# Patient Record
Sex: MALE | Employment: FULL TIME | URBAN - METROPOLITAN AREA
[De-identification: names, ages, dates, MRNs, and addresses within clinical notes are randomized per-mention and may not be internally consistent; named-entity substitution may affect disease eponyms.]

---

## 2017-09-04 ENCOUNTER — HOSPITAL ENCOUNTER (EMERGENCY)
Age: 27
Discharge: HOME | End: 2017-09-04
Attending: EMERGENCY MEDICINE
Payer: COMMERCIAL

## 2017-09-04 VITALS
OXYGEN SATURATION: 98 % | RESPIRATION RATE: 16 BRPM | WEIGHT: 130 LBS | HEIGHT: 69 IN | SYSTOLIC BLOOD PRESSURE: 125 MMHG | HEART RATE: 88 BPM | DIASTOLIC BLOOD PRESSURE: 86 MMHG | TEMPERATURE: 98.3 F | BODY MASS INDEX: 19.26 KG/M2

## 2017-09-04 DIAGNOSIS — B20 HIV (HUMAN IMMUNODEFICIENCY VIRUS INFECTION) (HCC): ICD-10-CM

## 2017-09-04 DIAGNOSIS — E87.6 HYPOKALEMIA: ICD-10-CM

## 2017-09-04 DIAGNOSIS — E87.1 HYPONATREMIA: ICD-10-CM

## 2017-09-04 DIAGNOSIS — E86.0 DEHYDRATION: Primary | ICD-10-CM

## 2017-09-04 LAB
ALBUMIN SERPL-MCNC: 4.1 G/DL (ref 3.4–5)
ALBUMIN/GLOB SERPL: 1 {RATIO} (ref 0.8–1.7)
ALP SERPL-CCNC: 71 U/L (ref 45–117)
ALT SERPL-CCNC: 38 U/L (ref 16–61)
ANION GAP SERPL CALC-SCNC: 8 MMOL/L (ref 3–18)
APPEARANCE UR: CLEAR
AST SERPL-CCNC: 72 U/L (ref 15–37)
BACTERIA URNS QL MICRO: NEGATIVE /HPF
BASOPHILS # BLD: 0 K/UL (ref 0–0.1)
BASOPHILS NFR BLD: 0 % (ref 0–2)
BILIRUB SERPL-MCNC: 0.2 MG/DL (ref 0.2–1)
BILIRUB UR QL: NEGATIVE
BUN SERPL-MCNC: 10 MG/DL (ref 7–18)
BUN/CREAT SERPL: 11 (ref 12–20)
CALCIUM SERPL-MCNC: 8.4 MG/DL (ref 8.5–10.1)
CHLORIDE SERPL-SCNC: 100 MMOL/L (ref 100–108)
CO2 SERPL-SCNC: 25 MMOL/L (ref 21–32)
COLOR UR: ABNORMAL
CREAT SERPL-MCNC: 0.93 MG/DL (ref 0.6–1.3)
DIFFERENTIAL METHOD BLD: ABNORMAL
EOSINOPHIL # BLD: 0.1 K/UL (ref 0–0.4)
EOSINOPHIL NFR BLD: 2 % (ref 0–5)
EPITH CASTS URNS QL MICRO: ABNORMAL /LPF (ref 0–5)
ERYTHROCYTE [DISTWIDTH] IN BLOOD BY AUTOMATED COUNT: 12.1 % (ref 11.6–14.5)
GLOBULIN SER CALC-MCNC: 4 G/DL (ref 2–4)
GLUCOSE SERPL-MCNC: 97 MG/DL (ref 74–99)
GLUCOSE UR STRIP.AUTO-MCNC: NEGATIVE MG/DL
HCT VFR BLD AUTO: 36.5 % (ref 36–48)
HGB BLD-MCNC: 12.7 G/DL (ref 13–16)
HGB UR QL STRIP: ABNORMAL
KETONES UR QL STRIP.AUTO: ABNORMAL MG/DL
LEUKOCYTE ESTERASE UR QL STRIP.AUTO: NEGATIVE
LIPASE SERPL-CCNC: 114 U/L (ref 73–393)
LYMPHOCYTES # BLD: 1 K/UL (ref 0.9–3.6)
LYMPHOCYTES NFR BLD: 20 % (ref 21–52)
MCH RBC QN AUTO: 29.8 PG (ref 24–34)
MCHC RBC AUTO-ENTMCNC: 34.8 G/DL (ref 31–37)
MCV RBC AUTO: 85.7 FL (ref 74–97)
MONOCYTES # BLD: 0.6 K/UL (ref 0.05–1.2)
MONOCYTES NFR BLD: 13 % (ref 3–10)
MUCOUS THREADS URNS QL MICRO: ABNORMAL /LPF
NEUTS SEG # BLD: 3.3 K/UL (ref 1.8–8)
NEUTS SEG NFR BLD: 65 % (ref 40–73)
NITRITE UR QL STRIP.AUTO: NEGATIVE
PH UR STRIP: 6.5 [PH] (ref 5–8)
PLATELET # BLD AUTO: 268 K/UL (ref 135–420)
PMV BLD AUTO: 9.2 FL (ref 9.2–11.8)
POTASSIUM SERPL-SCNC: 3.1 MMOL/L (ref 3.5–5.5)
PROT SERPL-MCNC: 8.1 G/DL (ref 6.4–8.2)
PROT UR STRIP-MCNC: ABNORMAL MG/DL
RBC # BLD AUTO: 4.26 M/UL (ref 4.7–5.5)
RBC #/AREA URNS HPF: ABNORMAL /HPF (ref 0–5)
SODIUM SERPL-SCNC: 133 MMOL/L (ref 136–145)
SP GR UR REFRACTOMETRY: 1.03 (ref 1–1.03)
UROBILINOGEN UR QL STRIP.AUTO: 1 EU/DL (ref 0.2–1)
WBC # BLD AUTO: 5.1 K/UL (ref 4.6–13.2)
WBC URNS QL MICRO: ABNORMAL /HPF (ref 0–4)

## 2017-09-04 PROCEDURE — 99283 EMERGENCY DEPT VISIT LOW MDM: CPT

## 2017-09-04 PROCEDURE — 85025 COMPLETE CBC W/AUTO DIFF WBC: CPT | Performed by: EMERGENCY MEDICINE

## 2017-09-04 PROCEDURE — 74011250637 HC RX REV CODE- 250/637: Performed by: EMERGENCY MEDICINE

## 2017-09-04 PROCEDURE — 80053 COMPREHEN METABOLIC PANEL: CPT | Performed by: EMERGENCY MEDICINE

## 2017-09-04 PROCEDURE — 83690 ASSAY OF LIPASE: CPT | Performed by: EMERGENCY MEDICINE

## 2017-09-04 PROCEDURE — 96361 HYDRATE IV INFUSION ADD-ON: CPT

## 2017-09-04 PROCEDURE — 96374 THER/PROPH/DIAG INJ IV PUSH: CPT

## 2017-09-04 PROCEDURE — 74011000250 HC RX REV CODE- 250: Performed by: EMERGENCY MEDICINE

## 2017-09-04 PROCEDURE — 81001 URINALYSIS AUTO W/SCOPE: CPT | Performed by: EMERGENCY MEDICINE

## 2017-09-04 PROCEDURE — 74011250636 HC RX REV CODE- 250/636: Performed by: EMERGENCY MEDICINE

## 2017-09-04 RX ORDER — ONDANSETRON 8 MG/1
8 TABLET, ORALLY DISINTEGRATING ORAL
Qty: 12 TAB | Refills: 0 | Status: SHIPPED | OUTPATIENT
Start: 2017-09-04

## 2017-09-04 RX ORDER — FAMOTIDINE 10 MG/ML
20 INJECTION INTRAVENOUS
Status: COMPLETED | OUTPATIENT
Start: 2017-09-04 | End: 2017-09-04

## 2017-09-04 RX ORDER — ONDANSETRON 4 MG/1
4 TABLET, ORALLY DISINTEGRATING ORAL
Status: COMPLETED | OUTPATIENT
Start: 2017-09-04 | End: 2017-09-04

## 2017-09-04 RX ORDER — ONDANSETRON 8 MG/1
8 TABLET, ORALLY DISINTEGRATING ORAL
Status: DISCONTINUED | OUTPATIENT
Start: 2017-09-04 | End: 2017-09-04

## 2017-09-04 RX ORDER — POTASSIUM CHLORIDE 1.5 G/1.77G
40 POWDER, FOR SOLUTION ORAL
Status: COMPLETED | OUTPATIENT
Start: 2017-09-04 | End: 2017-09-04

## 2017-09-04 RX ADMIN — POTASSIUM CHLORIDE 40 MEQ: 1.5 POWDER, FOR SOLUTION ORAL at 18:26

## 2017-09-04 RX ADMIN — ONDANSETRON 4 MG: 4 TABLET, ORALLY DISINTEGRATING ORAL at 17:32

## 2017-09-04 RX ADMIN — SODIUM CHLORIDE 1000 ML: 900 INJECTION, SOLUTION INTRAVENOUS at 18:14

## 2017-09-04 RX ADMIN — FAMOTIDINE 20 MG: 10 INJECTION, SOLUTION INTRAVENOUS at 19:40

## 2017-09-04 NOTE — DISCHARGE INSTRUCTIONS
Dehydration: Care Instructions  Your Care Instructions  Dehydration happens when your body loses too much fluid. This might happen when you do not drink enough water or you lose large amounts of fluids from your body because of diarrhea, vomiting, or sweating. Severe dehydration can be life-threatening. Water and minerals called electrolytes help put your body fluids back in balance. Learn the early signs of fluid loss, and drink more fluids to prevent dehydration. Follow-up care is a key part of your treatment and safety. Be sure to make and go to all appointments, and call your doctor if you are having problems. It's also a good idea to know your test results and keep a list of the medicines you take. How can you care for yourself at home? · To prevent dehydration, drink plenty of fluids, enough so that your urine is light yellow or clear like water. Choose water and other caffeine-free clear liquids until you feel better. If you have kidney, heart, or liver disease and have to limit fluids, talk with your doctor before you increase the amount of fluids you drink. · If you do not feel like eating or drinking, try taking small sips of water, sports drinks, or other rehydration drinks. · Get plenty of rest.  To prevent dehydration  · Add more fluids to your diet and daily routine, unless your doctor has told you not to. · During hot weather, drink more fluids. Drink even more fluids if you exercise a lot. Stay away from drinks with alcohol or caffeine. · Watch for the symptoms of dehydration. These include:  ¨ A dry, sticky mouth. ¨ Dark yellow urine, and not much of it. ¨ Dry and sunken eyes. ¨ Feeling very tired. · Learn what problems can lead to dehydration. These include:  ¨ Diarrhea, fever, and vomiting. ¨ Any illness with a fever, such as pneumonia or the flu. ¨ Activities that cause heavy sweating, such as endurance races and heavy outdoor work in hot or humid weather.   ¨ Alcohol or drug abuse or withdrawal.  ¨ Certain medicines, such as cold and allergy pills (antihistamines), diet pills (diuretics), and laxatives. ¨ Certain diseases, such as diabetes, cancer, and heart or kidney disease. When should you call for help? Call 911 anytime you think you may need emergency care. For example, call if:  · You passed out (lost consciousness). Call your doctor now or seek immediate medical care if:  · You are confused and cannot think clearly. · You are dizzy or lightheaded, or you feel like you may faint. · You have signs of needing more fluids. You have sunken eyes and a dry mouth, and you pass only a little dark urine. · You cannot keep fluids down. Watch closely for changes in your health, and be sure to contact your doctor if:  · You are not making tears. · Your skin is very dry and sags slowly back into place after you pinch it. · Your mouth and eyes are very dry. Where can you learn more? Go to http://griselda-howie.info/. Enter F716 in the search box to learn more about \"Dehydration: Care Instructions. \"  Current as of: March 20, 2017  Content Version: 11.3  © 0209-3035 [employer]. Care instructions adapted under license by [employer] (which disclaims liability or warranty for this information). If you have questions about a medical condition or this instruction, always ask your healthcare professional. [place] [address] any warranty or liability for your use of this information. Hypokalemia: Care Instructions  Your Care Instructions  Hypokalemia (say \"wk-kx-rfr-ENEDINA-karie-uh\") is a low level of potassium. The heart, muscles, kidneys, and nervous system all need potassium to work well. This problem has many different causes. Kidney problems, diet, and medicines like diuretics and laxatives can cause it. So can vomiting or diarrhea. In some cases, cancer is the cause.  Your doctor may do tests to find the cause of your low potassium levels. You may need medicines to bring your potassium levels back to normal. You may also need regular blood tests to check your potassium. If you have very low potassium, you may need intravenous (IV) medicines. You also may need tests to check the electrical activity of your heart. Heart problems caused by low potassium levels can be very serious. Follow-up care is a key part of your treatment and safety. Be sure to make and go to all appointments, and call your doctor if you are having problems. It's also a good idea to know your test results and keep a list of the medicines you take. How can you care for yourself at home? · If your doctor recommends it, eat foods that have a lot of potassium. These include fresh fruits, juices, and vegetables. They also include nuts, beans, and milk. · Be safe with medicines. If your doctor prescribes medicines or potassium supplements, take them exactly as directed. Call your doctor if you have any problems with your medicines. · Get your potassium levels tested as often as your doctor tells you. When should you call for help? Call 911 anytime you think you may need emergency care. For example, call if:  · You feel like your heart is missing beats. Heart problems caused by low potassium can cause death. · You passed out (lost consciousness). · You have a seizure. Call your doctor now or seek immediate medical care if:  · You feel weak or unusually tired. · You have severe arm or leg cramps. · You have tingling or numbness. · You feel sick to your stomach, or you vomit. · You have belly cramps. · You feel bloated or constipated. · You have to urinate a lot. · You feel very thirsty most of the time. · You are dizzy or lightheaded, or you feel like you may faint. · You feel depressed, or you lose touch with reality. Watch closely for changes in your health, and be sure to contact your doctor if:  · You do not get better as expected.   Where can you learn more? Go to http://griselda-howie.info/. Enter G358 in the search box to learn more about \"Hypokalemia: Care Instructions. \"  Current as of: July 28, 2016  Content Version: 11.3  © 6778-7663 [employer]. Care instructions adapted under license by [employer] (which disclaims liability or warranty for this information). If you have questions about a medical condition or this instruction, always ask your healthcare professional. [address] any warranty or liability for your use of this information. Hyponatremia: Care Instructions  Your Care Instructions  Hyponatremia (say \"gj-jf-nrx-TREE-karie-uh\") means that you don't have enough sodium in your blood. It can cause nausea, vomiting, and headaches. Or you may not feel hungry. In serious cases, it can cause seizures, a coma, or even death. Hyponatremia is not a disease. It is a problem caused by something else, such as medicines or exercising for a long time in hot weather. You can get hyponatremia if you lose a lot of fluids and then you drink a lot of water or other liquids that don't have much sodium. You can also get it if you have kidney, liver, heart, or other health problems. Treatment is focused on getting your sodium levels back to normal.  Follow-up care is a key part of your treatment and safety. Be sure to make and go to all appointments, and call your doctor if you are having problems. It's also a good idea to know your test results and keep a list of the medicines you take. How can you care for yourself at home? · If your doctor recommends it, drink fluids that have sodium. Sports drinks are a good choice. Or you can eat salty foods. · If your doctor recommends it, limit the amount of water you drink. And limit fluids that are mostly water. These include tea, coffee, and juice. · Take your medicines exactly as prescribed.  Call your doctor if you have any problems with your medicine. · Get your sodium levels tested when your doctor tells you to. When should you call for help? Call 911 anytime you think you may need emergency care. For example, call if:  · You have a seizure. · You passed out (lost consciousness). Call your doctor now or seek immediate medical care if:  · You are confused or it is hard to focus. · You have little or no appetite. · You feel sick to your stomach or you vomit. · You have a headache. · You have mood changes. · You feel more tired than usual.  Watch closely for changes in your health, and be sure to contact your doctor if:  · You do not get better as expected. Where can you learn more? Go to http://griselda-howie.info/. Enter K546 in the search box to learn more about \"Hyponatremia: Care Instructions. \"  Current as of: October 14, 2016  Content Version: 11.3  © 4818-2339 [employer]. Care instructions adapted under license by [employer] (which disclaims liability or warranty for this information). If you have questions about a medical condition or this instruction, always ask your healthcare professional. [place] [address] any warranty or liability for your use of this information. HIV Medicine Management: Care Instructions  Your Care Instructions  Taking antiretroviral medicines for HIV may allow you to stay healthy for a long time. Successful treatment of your HIV infection depends on following a strict schedule for taking medicine. Not taking medicine when you are supposed to can lead to problems such as drug resistance and higher viral loads, and the disease may get worse. In the past, schedules for taking medicine for HIV were very complicated. Taking many pills several times each day was difficult. But the routine has become much more simple, and you may only have to take medicine one or two times each day. Follow-up care is a key part of your treatment and safety.  Be sure to make and go to all appointments, and call your doctor if you are having problems. It's also a good idea to know your test results and keep a list of the medicines you take. How can you care for yourself at home? Staying on your schedule  · Know the names of all of your medicines. Ask your doctor or pharmacist to clearly explain the actions and purpose of each of your medicines. If you understand what you are taking and how it helps, it may be easier to follow your schedule. Write down the generic name (and brand name if there is one) for all of your medicines. Have your doctor check the list.  · Know when to take your medicine. Write down a schedule, and have your doctor check it. Try to take them around the same times every day. · Use a pillbox with compartments for each time you need to take your medicines. Using a pillbox lets you know when you have taken each dose so that you do not miss a dose. And it also will help you not take too many pills. · Know how to handle missed doses. Talk with your doctor about what you should do if you miss a dose. Discuss what to do for each medicine--it may be different for each one. Other tips  · Always check with your doctor before taking any other medicines. This includes over-the-counter medicines and any herbal or \"natural\" supplements. · Learn about other medicines you should not take at the same time as your antiretroviral medicines. · Store medicines properly. Find out from your doctor or pharmacist how to properly store your medicines. Keeping medicines in a location that is too hot or too cold may decrease how well they work. Always store medicines out of the reach of children. · Watch for side effects. Ask your doctor or pharmacist what to expect. Tell your doctor right away if you have any serious side effects. Do not stop taking your medicine or change the dose on your own. This could cause the medicine to stop working.  Work with your doctor to find a solution. · Make a list of all the medicines you take, and bring it with you to each visit with your doctor. Have your doctor review your list at each visit. When should you call for help? Call 911 anytime you think you may need emergency care. For example, call if:  · You are wheezing or having trouble breathing after starting a medicine. · Your lips, throat, tongue, or face are swelling quickly. Call your doctor now or seek immediate medical care if:  · You have problems taking your medicine. · You have trouble taking your medicine when you are supposed to. · You notice side effects from your medicine. These may include:  ¨ A skin rash. ¨ A fever. ¨ Nausea and vomiting. ¨ Fatigue. ¨ Trouble breathing. Watch closely for changes in your health, and be sure to contact your doctor if you have any problems. Where can you learn more? Go to http://griselda-howie.info/. Enter 0664 [id number] in the search box to learn more about \"HIV Medicine Management: Care Instructions. \"  Current as of: March 3, 2017  Content Version: 11.3  © 1677-1621 [employer]. Care instructions adapted under license by [employer] (which disclaims liability or warranty for this information). If you have questions about a medical condition or this instruction, always ask your healthcare professional. [address] any warranty or liability for your use of this information.

## 2017-09-04 NOTE — ED TRIAGE NOTES
Vomiting, diarrhea Friday night, then  Over weekend had hiccups , and abd pain. No vomiting today, no diarrhea today.

## 2017-09-04 NOTE — ED PROVIDER NOTES
HPI Comments: Kirk Gonzalez is a 28 y.o. male with of HIV presenting to the ED with vomiting and diarrhea that began 3 days ago. Severity is 7/10. Pt states he has not had any vomiting or diarrhea today. Notes abdominal pain when he bends down. Associated sx include nausea and intermittent hiccups that began 2 days ago. Reports appetite change when sx began. Admits to smoking. Denies any other complaints. The history is provided by the patient. No past medical history on file. No past surgical history on file. No family history on file. Social History     Social History    Marital status: SINGLE     Spouse name: N/A    Number of children: N/A    Years of education: N/A     Occupational History    Not on file. Social History Main Topics    Smoking status: Current Every Day Smoker     Packs/day: 0.25     Years: 8.00    Smokeless tobacco: Never Used    Alcohol use 1.2 oz/week     2 Shots of liquor per week      Comment: socially    Drug use: No    Sexual activity: Yes     Partners: Male     Other Topics Concern    Not on file     Social History Narrative         ALLERGIES: Review of patient's allergies indicates no known allergies. Review of Systems   Constitutional: Positive for appetite change. Negative for activity change, fatigue and fever. Intermittent hiccups    HENT: Negative for congestion and rhinorrhea. Eyes: Negative for visual disturbance. Respiratory: Negative for shortness of breath. Cardiovascular: Negative for chest pain and palpitations. Gastrointestinal: Positive for abdominal pain, diarrhea, nausea and vomiting. Genitourinary: Negative for dysuria and hematuria. Musculoskeletal: Negative for back pain. Skin: Negative for rash. Neurological: Negative for dizziness, weakness and light-headedness. All other systems reviewed and are negative.       Vitals:    09/04/17 1629   BP: 125/86   Pulse: 88   Resp: 16   Temp: 98.3 °F (36.8 °C) SpO2: 98%   Weight: 59 kg (130 lb)   Height: 5' 9\" (1.753 m)            Physical Exam   Constitutional: He is oriented to person, place, and time. He appears well-developed and well-nourished. No distress. Thin, no distress    HENT:   Head: Normocephalic and atraumatic. Right Ear: External ear normal.   Left Ear: External ear normal.   Nose: Nose normal.   Mouth/Throat: Oropharynx is clear and moist.   Eyes: Conjunctivae and EOM are normal. Pupils are equal, round, and reactive to light. No scleral icterus. Neck: Normal range of motion. Neck supple. No JVD present. No tracheal deviation present. No thyromegaly present. Cardiovascular: Normal rate, regular rhythm, normal heart sounds and intact distal pulses. Exam reveals no gallop and no friction rub. No murmur heard. Pulmonary/Chest: Effort normal and breath sounds normal. He exhibits no tenderness. Abdominal: Soft. Bowel sounds are normal. He exhibits no distension. There is tenderness. There is no rebound and no guarding. Epigastric pain without guarding    Musculoskeletal: Normal range of motion. He exhibits no edema or tenderness. Lymphadenopathy:     He has no cervical adenopathy. Neurological: He is alert and oriented to person, place, and time. No cranial nerve deficit. Coordination normal.   No sensory loss, Gait normal, Motor 5/5   Skin: Skin is warm and dry. Psychiatric: He has a normal mood and affect. His behavior is normal. Judgment and thought content normal.   Nursing note and vitals reviewed. MDM  Number of Diagnoses or Management Options  Diagnosis management comments: Pt is a 30yo male with a hx of HIV on HAART combination medication for one month presents with complaint of diarrhea, vomiting, and epigastric pain for the past 4 days. Pt nausea, vomiting, and diarrhea have improved and now he has persistent hiccups. Pt abdomen is soft with mild epigastric pain without distension or masses.   Will follow axel, h/h, UA, lfts, lipase then reevaluate. Brett Medrano DO 5:00 PM      ED Course       Procedures      Vitals:  Patient Vitals for the past 12 hrs:   Temp Pulse Resp BP SpO2   09/04/17 1629 98.3 °F (36.8 °C) 88 16 125/86 98 %       Medications Ordered:  Medications   famotidine (PF) (PEPCID) injection 20 mg (not administered)   ondansetron (ZOFRAN ODT) tablet 4 mg (4 mg Oral Given 9/4/17 1732)   potassium chloride (KLOR-CON) packet 40 mEq (40 mEq Oral Given 9/4/17 1826)   sodium chloride 0.9 % bolus infusion 1,000 mL (1,000 mL IntraVENous New Bag 9/4/17 1814)       Lab Findings:  Recent Results (from the past 12 hour(s))   URINALYSIS W/ RFLX MICROSCOPIC    Collection Time: 09/04/17  4:45 PM   Result Value Ref Range    Color DARK YELLOW      Appearance CLEAR      Specific gravity 1.027 1.005 - 1.030      pH (UA) 6.5 5.0 - 8.0      Protein TRACE (A) NEG mg/dL    Glucose NEGATIVE  NEG mg/dL    Ketone TRACE (A) NEG mg/dL    Bilirubin NEGATIVE  NEG      Blood SMALL (A) NEG      Urobilinogen 1.0 0.2 - 1.0 EU/dL    Nitrites NEGATIVE  NEG      Leukocyte Esterase NEGATIVE  NEG     URINE MICROSCOPIC ONLY    Collection Time: 09/04/17  4:45 PM   Result Value Ref Range    WBC 1 to 3 0 - 4 /hpf    RBC 3 to 5 0 - 5 /hpf    Epithelial cells FEW 0 - 5 /lpf    Bacteria NEGATIVE  NEG /hpf    Mucus FEW (A) NEG /lpf   CBC WITH AUTOMATED DIFF    Collection Time: 09/04/17  4:53 PM   Result Value Ref Range    WBC 5.1 4.6 - 13.2 K/uL    RBC 4.26 (L) 4.70 - 5.50 M/uL    HGB 12.7 (L) 13.0 - 16.0 g/dL    HCT 36.5 36.0 - 48.0 %    MCV 85.7 74.0 - 97.0 FL    MCH 29.8 24.0 - 34.0 PG    MCHC 34.8 31.0 - 37.0 g/dL    RDW 12.1 11.6 - 14.5 %    PLATELET 614 548 - 146 K/uL    MPV 9.2 9.2 - 11.8 FL    NEUTROPHILS 65 40 - 73 %    LYMPHOCYTES 20 (L) 21 - 52 %    MONOCYTES 13 (H) 3 - 10 %    EOSINOPHILS 2 0 - 5 %    BASOPHILS 0 0 - 2 %    ABS. NEUTROPHILS 3.3 1.8 - 8.0 K/UL    ABS. LYMPHOCYTES 1.0 0.9 - 3.6 K/UL    ABS. MONOCYTES 0.6 0.05 - 1.2 K/UL    ABS. EOSINOPHILS 0.1 0.0 - 0.4 K/UL    ABS. BASOPHILS 0.0 0.0 - 0.1 K/UL    DF AUTOMATED     METABOLIC PANEL, COMPREHENSIVE    Collection Time: 09/04/17  4:53 PM   Result Value Ref Range    Sodium 133 (L) 136 - 145 mmol/L    Potassium 3.1 (L) 3.5 - 5.5 mmol/L    Chloride 100 100 - 108 mmol/L    CO2 25 21 - 32 mmol/L    Anion gap 8 3.0 - 18 mmol/L    Glucose 97 74 - 99 mg/dL    BUN 10 7.0 - 18 MG/DL    Creatinine 0.93 0.6 - 1.3 MG/DL    BUN/Creatinine ratio 11 (L) 12 - 20      GFR est AA >60 >60 ml/min/1.73m2    GFR est non-AA >60 >60 ml/min/1.73m2    Calcium 8.4 (L) 8.5 - 10.1 MG/DL    Bilirubin, total 0.2 0.2 - 1.0 MG/DL    ALT (SGPT) 38 16 - 61 U/L    AST (SGOT) 72 (H) 15 - 37 U/L    Alk. phosphatase 71 45 - 117 U/L    Protein, total 8.1 6.4 - 8.2 g/dL    Albumin 4.1 3.4 - 5.0 g/dL    Globulin 4.0 2.0 - 4.0 g/dL    A-G Ratio 1.0 0.8 - 1.7     LIPASE    Collection Time: 09/04/17  4:53 PM   Result Value Ref Range    Lipase 114 73 - 393 U/L         Progress notes, consult notes, or additional procedure notes:  Pt has mild hypokalemia and hyponatremia. Suspect resolving hyponatremia. Will hydrated and reevaluate. Pt is feeling better and has not had diarrhea for the past 48hrs. Suspect his sodium will continue to correct. Pt is on Genvoya and has been on it for several months. Diarrhea and vomiting are side effects of the medication however hyponatremia is not. Karmen Sethi DO 6:34 PM      Reevaluation of the patient:     7:21 PM: Pt is feeling better. States he is hungry and will like to go home. Educated him to drink two bottles of Gatorade tomorrow and to rest. Pt is to follow-up with PCP. He is to return if at all worsens or concerns. Diagnosis:   1. Dehydration    2. Hyponatremia    3. Hypokalemia    4.  HIV (human immunodeficiency virus infection) ([facility]        Disposition: Discharge    Follow-up Information     None           Patient's Medications   Start Taking    ONDANSETRON (ZOFRAN ODT) 8 MG DISINTEGRATING TABLET    Take 1 Tab by mouth every eight (8) hours as needed for Nausea for up to 12 doses. Continue Taking    CYCLOBENZAPRINE (FLEXERIL) 10 MG TABLET    Take 1 Tab by mouth three (3) times daily as needed for Muscle Spasm(s). HYDROCODONE-ACETAMINOPHEN (NORCO) 5-325 MG PER TABLET    Take 1 Tab by mouth every four (4) hours as needed for Pain. These Medications have changed    No medications on file   Stop Taking    HYDROCODONE-ACETAMINOPHEN (NORCO) 5-325 MG PER TABLET    Take 1 Tab by mouth every four (4) hours as needed for Pain. Shira [address] [place] [address] acting as a scribe for and in the presence of Yannick Chandra MD      September 04, 2017 at 5:05 PM       Provider Attestation:      I personally performed the services described in the documentation, reviewed the documentation, as recorded by the scribe in my presence, and it accurately and completely records my words and actions.  September 04, 2017 at 5:05 PM - Yannick Chandra MD

## 2018-04-10 ENCOUNTER — HOSPITAL ENCOUNTER (EMERGENCY)
Age: 28
Discharge: HOME | End: 2018-04-10
Attending: EMERGENCY MEDICINE
Payer: COMMERCIAL

## 2018-04-10 ENCOUNTER — APPOINTMENT (OUTPATIENT)
Dept: GENERAL RADIOLOGY | Age: 28
End: 2018-04-10
Attending: EMERGENCY MEDICINE
Payer: COMMERCIAL

## 2018-04-10 VITALS
DIASTOLIC BLOOD PRESSURE: 71 MMHG | RESPIRATION RATE: 16 BRPM | HEART RATE: 81 BPM | TEMPERATURE: 100.7 F | OXYGEN SATURATION: 100 % | SYSTOLIC BLOOD PRESSURE: 119 MMHG

## 2018-04-10 DIAGNOSIS — R68.89 FLU-LIKE SYMPTOMS: Primary | ICD-10-CM

## 2018-04-10 PROCEDURE — 71046 X-RAY EXAM CHEST 2 VIEWS: CPT

## 2018-04-10 PROCEDURE — 74011250637 HC RX REV CODE- 250/637: Performed by: PHYSICIAN ASSISTANT

## 2018-04-10 PROCEDURE — 99283 EMERGENCY DEPT VISIT LOW MDM: CPT

## 2018-04-10 RX ORDER — IBUPROFEN 600 MG/1
600 TABLET ORAL
Qty: 20 TAB | Refills: 0 | Status: SHIPPED | OUTPATIENT
Start: 2018-04-10

## 2018-04-10 RX ORDER — ACETAMINOPHEN 500 MG
1000 TABLET ORAL
Status: DISCONTINUED | OUTPATIENT
Start: 2018-04-10 | End: 2018-04-10

## 2018-04-10 RX ORDER — IBUPROFEN 600 MG/1
600 TABLET ORAL
Status: COMPLETED | OUTPATIENT
Start: 2018-04-10 | End: 2018-04-10

## 2018-04-10 RX ORDER — OSELTAMIVIR PHOSPHATE 75 MG/1
75 CAPSULE ORAL 2 TIMES DAILY
Qty: 10 CAP | Refills: 0 | Status: SHIPPED | OUTPATIENT
Start: 2018-04-10 | End: 2018-04-15

## 2018-04-10 RX ADMIN — IBUPROFEN 600 MG: 600 TABLET ORAL at 17:31

## 2018-04-10 NOTE — ED PROVIDER NOTES
EMERGENCY DEPARTMENT HISTORY AND PHYSICAL EXAM    4:32 PM      Date: 4/10/2018  Patient Name: Bryan Severe Boney    History of Presenting Illness     Chief Complaint   Patient presents with    Headache    Sinus Infection         History Provided By: Patient    Chief Complaint: Headache   Duration: 3 Days  Timing:  Worsening  Location: generalized   Quality: \"throbbing\"  Modifying Factors: Applied a warm washcloth over his forehead with some relief  Associated Symptoms: improving nasal congestion, rhinorrhea, sore throat, chills, productive cough, and generalized body aches      Additional History (Context):     Faith Maddox is a 33 y.o. male with HIV who is seen at the [facility] Department and on anti-virals for such presents to the ED c/o \"throbbing,\" HA starting 3 days ago, worsening yesterday. Took Tylenol with no relief. Applied a warm washcloth over his forehead with some relief. Reports he was \"sick\" over the weekend, but his sxs have been improving. His sxs included nasal congestion, rhinorrhea, sore throat, chills, decreased appetite, productive cough, and generalized body aches. Pt denies nausea, vomiting, urinary sxs. He received the influenza vaccine this year. No other acute symptoms or complaints were noted. Unsure of exact CD4 and viral load but states he was told they were good. PCP: None    Current Facility-Administered Medications   Medication Dose Route Frequency Provider Last Rate Last Dose    acetaminophen (TYLENOL) tablet 1,000 mg  1,000 mg Oral NOW Marissa Belcher PA-C         Current Outpatient Prescriptions   Medication Sig Dispense Refill    oseltamivir (TAMIFLU) 75 mg capsule Take 1 Cap by mouth two (2) times a day for 5 days. 10 Cap 0    ibuprofen (MOTRIN) 600 mg tablet Take 1 Tab by mouth every six (6) hours as needed for Pain.  20 Tab 0    ondansetron (ZOFRAN ODT) 8 mg disintegrating tablet Take 1 Tab by mouth every eight (8) hours as needed for Nausea for up to 12 doses. 12 Tab 0    HYDROcodone-acetaminophen (NORCO) 5-325 mg per tablet Take 1 Tab by mouth every four (4) hours as needed for Pain. 12 Tab 0    cyclobenzaprine (FLEXERIL) 10 mg tablet Take 1 Tab by mouth three (3) times daily as needed for Muscle Spasm(s). 15 Tab 0       Past History     Past Medical History:  History reviewed. No pertinent past medical history. Past Surgical History:  History reviewed. No pertinent surgical history. Family History:  History reviewed. No pertinent family history. Social History:  Social History   Substance Use Topics    Smoking status: Current Every Day Smoker     Packs/day: 0.25     Years: 8.00    Smokeless tobacco: Never Used    Alcohol use 1.2 oz/week     2 Shots of liquor per week      Comment: socially       Allergies:  No Known Allergies      Review of Systems       Review of Systems   Constitutional: Positive for appetite change and chills. HENT: Positive for congestion, rhinorrhea and sore throat. Respiratory: Positive for cough. Negative for shortness of breath. Cardiovascular: Negative for chest pain. Gastrointestinal: Negative for abdominal pain, nausea and vomiting. Musculoskeletal: Positive for myalgias (generalized ). Skin: Negative for rash. Neurological: Positive for headaches. Negative for weakness. All other systems reviewed and are negative. Physical Exam     Visit Vitals    /71 (BP 1 Location: Left arm, BP Patient Position: At rest)    Pulse 81    Temp (!) 100.7 °F (38.2 °C)    Resp 16    SpO2 100%         Physical Exam   Constitutional: He appears well-developed and well-nourished. No distress. HENT:   Head: Normocephalic and atraumatic. Right Ear: External ear normal.   Left Ear: External ear normal.   Nose: Nose normal.   Mouth/Throat: Oropharynx is clear and moist. No oropharyngeal exudate. Prominent, symmetric tonsils bilaterally, no exudates or uvular deviation.    Eyes: Conjunctivae are normal.   Neck: Normal range of motion. Neck supple. Cardiovascular: Normal rate, regular rhythm and normal heart sounds. Exam reveals no gallop and no friction rub. No murmur heard. Pulmonary/Chest: Effort normal and breath sounds normal. No respiratory distress. He has no wheezes. He has no rales. Musculoskeletal: Normal range of motion. Lymphadenopathy:     He has no cervical adenopathy. Neurological: He is alert. Skin: Skin is warm and dry. No rash noted. He is not diaphoretic. Psychiatric: He has a normal mood and affect. His behavior is normal.   Nursing note and vitals reviewed. Diagnostic Study Results     Labs -  No results found for this or any previous visit (from the past 12 hour(s)). Radiologic Studies -   XR CHEST PA LAT    (Results Pending)         Medical Decision Making   I am the first provider for this patient. I reviewed the vital signs, available nursing notes, past medical history, past surgical history, family history and social history. Vital Signs-Reviewed the patient's vital signs. Pulse Oximetry Analysis -  100% on room air (Interpretation)    Records Reviewed: Nursing Notes (Time of Review: 4:32 PM)    Provider Notes (Medical Decision Making): Pt with flu like symptoms that are improving. Given HIV status, will give Tamiflu despite being outside of the window and advise symptomatic treatment. CXR without obvious infiltrates, not hypoxic, cough improving. Do not feel abx are warranted for pneumonia. Diagnosis     Clinical Impression:   1.  Flu-like symptoms        Disposition: Discharged     Follow-up Information     Follow up With Details Comments [address] [facility]  As needed for primary care needs [address] [facility] [address]  [facility] [place] [address]  [address] [facility] EMERGENCY DEPT  As needed, If symptoms worsen [address] [facility]  [phone]           Patient's Medications   Start Taking    IBUPROFEN (MOTRIN) 600 MG TABLET    Take 1 Tab by mouth every six (6) hours as needed for Pain. OSELTAMIVIR (TAMIFLU) 75 MG CAPSULE    Take 1 Cap by mouth two (2) times a day for 5 days. Continue Taking    CYCLOBENZAPRINE (FLEXERIL) 10 MG TABLET    Take 1 Tab by mouth three (3) times daily as needed for Muscle Spasm(s). HYDROCODONE-ACETAMINOPHEN (NORCO) 5-325 MG PER TABLET    Take 1 Tab by mouth every four (4) hours as needed for Pain. ONDANSETRON (ZOFRAN ODT) 8 MG DISINTEGRATING TABLET    Take 1 Tab by mouth every eight (8) hours as needed for Nausea for up to 12 doses. These Medications have changed    No medications on file   Stop Taking    No medications on file     _______________________________    Attestations:  Scribe Attestation     Yuly Camacho acting as a scribe for and in the presence of Chema Britton PA-C      April 10, 2018 at 5:15 PM       Provider Attestation:      I personally performed the services described in the documentation, reviewed the documentation, as recorded by the scribe in my presence, and it accurately and completely records my words and actions.  April 10, 2018 at 5:15 PM - Chema Britton PA-C    _______________________________

## 2018-04-10 NOTE — ED TRIAGE NOTES
\"I've had a headache all weekend and today the headache is worst, I feel like my sinuses are acting up, and I'm congested. \"

## 2020-06-30 ENCOUNTER — OFFICE VISIT (OUTPATIENT)
Dept: ORTHOPEDIC SURGERY | Facility: CLINIC | Age: 30
End: 2020-06-30

## 2020-06-30 VITALS
HEIGHT: 69 IN | HEART RATE: 81 BPM | BODY MASS INDEX: 21.62 KG/M2 | TEMPERATURE: 97.3 F | WEIGHT: 146 LBS | RESPIRATION RATE: 16 BRPM | OXYGEN SATURATION: 99 % | DIASTOLIC BLOOD PRESSURE: 78 MMHG | SYSTOLIC BLOOD PRESSURE: 135 MMHG

## 2020-06-30 DIAGNOSIS — M25.461 EFFUSION OF RIGHT KNEE: ICD-10-CM

## 2020-06-30 DIAGNOSIS — M25.561 ACUTE PAIN OF RIGHT KNEE: Primary | ICD-10-CM

## 2020-06-30 DIAGNOSIS — S82.001A CLOSED NONDISPLACED FRACTURE OF RIGHT PATELLA, UNSPECIFIED FRACTURE MORPHOLOGY, INITIAL ENCOUNTER: ICD-10-CM
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
## 2020-06-30 NOTE — LETTER
6/30/2020 2:39 PM 
 
Mr. Sam Valdovinos [address] To whom it may concern: 
 
Please note the above patient is under my care for a right knee injury. He is to remain on light duty as below. No climbing, crawling, scaffolding work, or standing/walking for extended periods. Please allow him to sit to stand and stand to sit to comfort. Please limit his push pull lift carry to no more than 5 pounds. Duration of limitations beginning today 6/30/2020 till 7/30/2020.  
 
 
 
 
Sincerely, 
 
 
Cindy Belcher PA-C

## 2020-06-30 NOTE — PROGRESS NOTES
Patient: Mar Sánchez                MRN: [id number]       SSN: [id number]  YOB: 1990        AGE: 31 y.o. SEX: male          PCP: None  06/30/20    Chief Complaint   Patient presents with    Knee Pain     right knee pain       HISTORY:  Mar Sánchez is a 31 y.o. male who presents to the office today following an injury that occurred on or about 16 June 2020. She was at a gas station shell on [facility]. in [place] at about 8 PM when as it was raining outside he slipped in a puddle of oil gas water mixture while he was attempting to get in his vehicle. He fell to the ground striking his right knee. He immediately had pain in the right knee and subsequently had difficulty walking up. He was seen through a now care on or about the same day and he was diagnosed with a nondisplaced inferior patella fracture of the right knee. He was placed in a medial lateral stay range of motion brace neoprene type. He has persisted with pain to the right knee particularly over the lower portion which limits his ability to stand for only short periods walk only short distances and climb and descend stairs. He does not do any crawling or stooping secondary to pain. Pain at rest 2-3 on a 10 point scale with his right knee and with activity to include aforementioned walking and standing pain increases to upwards of an 8-9 on a 10 point scale.           Pain Assessment  6/30/2020   Location of Pain Knee   Location Modifiers Right   Severity of Pain 5   Quality of Pain Dull;Aching   Duration of Pain A few hours   Frequency of Pain Intermittent   Aggravating Factors Walking;Standing   Limiting Behavior No   Relieving Factors Rest   Result of Injury Yes   Work-Related Injury No   Type of Injury Fall           No results found for: HBA1C, HGBE8, VJJ6OXAI, ATT6UYCY  Weight Metrics 6/30/2020 9/4/2017 11/4/2015 7/3/2014 8/8/2013 8/7/2013 8/6/2013   Weight 146 lb 130 lb 135 lb 140 lb 134 lb - 134 lb   BMI 21.56 kg/m2 19.2 kg/m2 19.93 kg/m2 20.67 kg/m2 19.78 kg/m2 19.78 kg/m2 -            Problem List Items Addressed This Visit     None      Visit Diagnoses     Acute pain of right knee    -  Primary    Relevant Orders    AMB SUPPLY ORDER    AMB POC X-RAY KNEE 3 VIEW          PAST MEDICAL HISTORY: No past medical history on file. PAST SURGICAL HISTORY: No past surgical history on file. ALLERGIES: No Known Allergies     CURRENT MEDICATIONS:  A list of medications prior to the time of admission include:  Prior to Admission medications    Medication Sig Start Date End Date Taking? Authorizing Provider   elviteg/cob/emtri/tenof alafen (GENVOYA PO) Take  by mouth. Yes Provider, Historical   ibuprofen (MOTRIN) 600 mg tablet Take 1 Tab by mouth every six (6) hours as needed for Pain. 4/10/18  Yes Janee Dutton PA-C   ondansetron (ZOFRAN ODT) 8 mg disintegrating tablet Take 1 Tab by mouth every eight (8) hours as needed for Nausea for up to 12 doses. 9/4/17   Mingo Yap MD   HYDROcodone-acetaminophen [facility]) 5-325 mg per tablet Take 1 Tab by mouth every four (4) hours as needed for Pain. 7/3/14   EDISON Macdonald   cyclobenzaprine (FLEXERIL) 10 mg tablet Take 1 Tab by mouth three (3) times daily as needed for Muscle Spasm(s). 7/3/14   EDISON Macdonald       FAMILY HISTORY: No family history on file. SOCIAL HISTORY:   Social History     Socioeconomic History    Marital status: SINGLE     Spouse name: Not on file    Number of children: Not on file    Years of education: Not on file    Highest education level: Not on file   Tobacco Use    Smoking status: Current Every Day Smoker     Packs/day: 0.25     Years: 8.00     Pack years: 2.00    Smokeless tobacco: Never Used   Substance and Sexual Activity    Alcohol use:  Yes     Alcohol/week: 2.0 standard drinks     Types: 2 Shots of liquor per week     Comment: socially    Drug use: No    Sexual activity: Yes     Partners: Male       ROS:No CP, No SOB, No fever/chills nor night sweats. No headaches, vision abnormalities to include double and oral loss of vision. No hearing abnormalities. Musculoskeletal pain per HPI. Pain is exacerbated positionally. Pt denies h/o spinal surgery, injections, or PT/chiropractor. Self treated with less than adequate relief on oral antiinflammatories. . Pt denies change in bowel or bladder habits. Pt denies fever, weight loss, or skin changes. EXAM:  Patient alert and oriented x 3,   CN II-XII grossly intact  Sitting comfortably in the exam room, interacting with conversation with pleasant affect. Breathing appears regular effortless with no visible usage of accessory muscles  Distal cap refill intact at 2/2 Jone UE / LE. Neuro intact Jone UE/LE to noxious stimuli    Ortho Specific exam:    Examination of right lower extremity reveals skin intact. He has a trace effusion. No warmth erythema edema or ecchymosis of the right knee. While supine his range of motion is guarded at 70 degrees of flexion actively -5 degrees to full extension of. There is some slight crepitation through ranging with patella tracking midline. There is tenderness over the inferior pole the patella. The patellar tendon and quad tendon structures are palpable without any defects noted. There is a negative Lockman sign. A negative Jaswinder sign. MCL and LCL are both intact against resistance. No calf tenderness or evidence of DVT of the right lower extremity. X-rays: [facility] [employer] 6/30/2020 of the right knee 3 view reveal a nondisplaced inferior third patella fracture. No other osseous lesions masses or step-offs identified. IMPRESSION:      ICD-10-CM ICD-9-CM    1. Acute pain of right knee M25.561 719.46 AMB SUPPLY ORDER      AMB POC X-RAY KNEE 3 VIEW   2. Status post fall with nondisplaced right patella fracture    3. Right knee effusion    4.   Decreased range of motion of the right knee secondary to above        PLAN: Today we discussed his diagnosis is seen in radiographic imagery's and confirmed on clinical exam.  He declined an aspiration injection today. The brace he was placed in through the [facility] facility was is inadequate for maintaining a safe healing course. The brace is therefore being discontinued and he is placed in a Breg range of motion brace locked at full extension. He is got a limit his walking and standing to comfort. He may remove the brace for hygiene purposes. He was given a note today limiting him to light duty with the ability to stand to sit sit to stand to comfort and limited standing/walking. Today his x-rays were reviewed copies were provided all questions answered to his satisfaction. We are to see him back in about 2 weeks for reevaluation and x-ray. Patient provided a reminder for a \"due or due soon\" health maintenance. I have asked the patient to schedule an appointment with their primary care provider for follow-up on general health maintenance concerns. Today all the patient's questions were answered to their satisfaction. Copies of x-rays reviewed if obtained this visit, and provided to patient. Dictation disclaimer:  Please note that this dictation was completed with [employer], the [employer] voice recognition software. Quite often unanticipated grammatical, syntax, homophones, and other interpretive errors are inadvertently transcribed by the computer software. Please disregard these errors. Please excuse any errors that have escaped final proofreading. Dodie ALVARES, APC, MPAS, PA-C  [facility]

## 2020-07-14 ENCOUNTER — OFFICE VISIT (OUTPATIENT)
Dept: ORTHOPEDIC SURGERY | Facility: CLINIC | Age: 30
End: 2020-07-14

## 2020-07-14 VITALS
SYSTOLIC BLOOD PRESSURE: 124 MMHG | WEIGHT: 143.6 LBS | DIASTOLIC BLOOD PRESSURE: 70 MMHG | HEIGHT: 69 IN | HEART RATE: 78 BPM | TEMPERATURE: 96.8 F | BODY MASS INDEX: 21.27 KG/M2

## 2020-07-14 DIAGNOSIS — M25.561 ACUTE PAIN OF RIGHT KNEE: Primary | ICD-10-CM

## 2020-07-14 DIAGNOSIS — S82.001A CLOSED NONDISPLACED FRACTURE OF RIGHT PATELLA, UNSPECIFIED FRACTURE MORPHOLOGY, INITIAL ENCOUNTER: ICD-10-CM

## 2020-07-14 NOTE — PROGRESS NOTES
Patient: Itz Godoy                MRN: [id number]       SSN: [id number]  YOB: 1990        AGE: 31 y.o. SEX: male          PCP: None  07/14/20    Chief Complaint   Patient presents with    Knee Pain     Rt     7/14/2020: Patient returns for re-x-ray of his right knee. Range of motion brace open to 30 degrees of flexion. He has full extension. Pain is improved overall. He is full weightbearing of his right lower extremity. HISTORY:  Itz Godoy is a 31 y.o. male who presents to the office today following an injury that occurred on or about 16 June 2020. She was at a gas station shell on [facility]. in [place] at about 8 PM when as it was raining outside he slipped in a puddle of oil gas water mixture while he was attempting to get in his vehicle. He fell to the ground striking his right knee. He immediately had pain in the right knee and subsequently had difficulty walking up. He was seen through a now care on or about the same day and he was diagnosed with a nondisplaced inferior patella fracture of the right knee. He was placed in a medial lateral stay range of motion brace neoprene type. He has persisted with pain to the right knee particularly over the lower portion which limits his ability to stand for only short periods walk only short distances and climb and descend stairs. He does not do any crawling or stooping secondary to pain. Pain at rest 2-3 on a 10 point scale with his right knee and with activity to include aforementioned walking and standing pain increases to upwards of an 8-9 on a 10 point scale.           Pain Assessment  7/14/2020   Location of Pain Knee   Location Modifiers Right   Severity of Pain 5   Quality of Pain Dull   Duration of Pain A few minutes   Frequency of Pain Several times daily   Aggravating Factors Other (Comment)   Aggravating Factors Comment when takes brace off   Limiting Behavior Yes   Relieving Factors Elevation   Result of Injury -   Work-Related Injury -   Type of Injury -           No results found for: HBA1C, HGBE8, EQM7SJNL, NAR1DWHY, RQH5IONV  Weight Metrics 7/14/2020 6/30/2020 9/4/2017 11/4/2015 7/3/2014 8/8/2013 8/7/2013   Weight 143 lb 9.6 oz 146 lb 130 lb 135 lb 140 lb 134 lb -   BMI 21.21 kg/m2 21.56 kg/m2 19.2 kg/m2 19.93 kg/m2 20.67 kg/m2 19.78 kg/m2 19.78 kg/m2            Problem List Items Addressed This Visit     None      Visit Diagnoses     Acute pain of right knee    -  Primary    Closed nondisplaced fracture of right patella, unspecified fracture morphology, initial encounter        Relevant Orders    AMB POC XRAY, KNEE; 1/2 VIEWS (Completed)          PAST MEDICAL HISTORY: History reviewed. No pertinent past medical history. PAST SURGICAL HISTORY: History reviewed. No pertinent surgical history. ALLERGIES: No Known Allergies     CURRENT MEDICATIONS:  A list of medications prior to the time of admission include:  Prior to Admission medications    Medication Sig Start Date End Date Taking? Authorizing Provider   elviteg/cob/emtri/tenof alafen (GENVOYA PO) Take  by mouth. Yes Provider, Historical   ibuprofen (MOTRIN) 600 mg tablet Take 1 Tab by mouth every six (6) hours as needed for Pain. 4/10/18  Yes Janee Dutton PA-C   ondansetron (ZOFRAN ODT) 8 mg disintegrating tablet Take 1 Tab by mouth every eight (8) hours as needed for Nausea for up to 12 doses. 9/4/17   Mansi Mays MD   HYDROcodone-acetaminophen [facility]) 5-325 mg per tablet Take 1 Tab by mouth every four (4) hours as needed for Pain. 7/3/14   EDISON Hernandez   cyclobenzaprine (FLEXERIL) 10 mg tablet Take 1 Tab by mouth three (3) times daily as needed for Muscle Spasm(s). 7/3/14   EDISON Hernandez       FAMILY HISTORY: History reviewed. No pertinent family history.     SOCIAL HISTORY:   Social History     Socioeconomic History    Marital status: SINGLE     Spouse name: Not on file  Number of children: Not on file    Years of education: Not on file    Highest education level: Not on file   Tobacco Use    Smoking status: Current Every Day Smoker     Packs/day: 0.25     Years: 8.00     Pack years: 2.00    Smokeless tobacco: Never Used   Substance and Sexual Activity    Alcohol use: Yes     Alcohol/week: 2.0 standard drinks     Types: 2 Shots of liquor per week     Comment: socially    Drug use: No    Sexual activity: Yes     Partners: Male       ROS:No CP, No SOB, No fever/chills nor night sweats. No headaches, vision abnormalities to include double and oral loss of vision. No hearing abnormalities. Musculoskeletal pain per HPI. Pain is exacerbated positionally. Pt denies h/o spinal surgery, injections, or PT/chiropractor. Self treated with less than adequate relief on oral antiinflammatories. . Pt denies change in bowel or bladder habits. Pt denies fever, weight loss, or skin changes. EXAM:  Patient alert and oriented x 3,   CN II-XII grossly intact  Sitting comfortably in the exam room, interacting with conversation with pleasant affect. Breathing appears regular effortless with no visible usage of accessory muscles  Distal cap refill intact at 2/2 Jone UE / LE. Neuro intact Jone UE/LE to noxious stimuli    Ortho Specific exam:    Examination of right lower extremity reveals skin intact. He has a trace effusion. No warmth erythema edema or ecchymosis of the right knee. While supine his range of motion is guarded at 70 degrees of flexion actively -5 degrees to full extension of. There is some slight crepitation through ranging with patella tracking midline. There is tenderness over the inferior pole the patella. The patellar tendon and quad tendon structures are palpable without any defects noted. There is a negative Lockman sign. A negative Jaswinder sign. MCL and LCL are both intact against resistance.   No calf tenderness or evidence of DVT of the right lower extremity. X-rays: [facility] 6/30/2020 with image repeat on 7/14/2020 revealing no interval change in below. Of the right knee 3 view reveal a nondisplaced inferior third patella fracture. No other osseous lesions masses or step-offs identified. IMPRESSION:      ICD-10-CM ICD-9-CM    1. Acute pain of right knee  M25.561 719.46    2. Closed nondisplaced fracture of right patella, unspecified fracture morphology, initial encounter  S82.001A 822.0 AMB POC XRAY, KNEE; 1/2 VIEWS   2.  Status post fall with nondisplaced right patella fracture, stable    3. Right knee effusion, resolved    4. Decreased range of motion of the right knee secondary to above, guarded wearing range of motion brace by Bijan. PLAN: Today we discussed his diagnosis. Overall he is better. He may remove the brace for hygiene purposes. He was given a note today limiting him to light duty with the ability to stand to sit sit to stand to comfort and limited standing/walking. Today his x-rays were reviewed copies were provided all questions answered to his satisfaction. We are to see him back in about 2 weeks for reevaluation and x-ray. Patient provided a reminder for a \"due or due soon\" health maintenance. I have asked the patient to schedule an appointment with their primary care provider for follow-up on general health maintenance concerns. Today all the patient's questions were answered to their satisfaction. Copies of x-rays reviewed if obtained this visit, and provided to patient. Dictation disclaimer:  Please note that this dictation was completed with [employer], the [employer] voice recognition software. Quite often unanticipated grammatical, syntax, homophones, and other interpretive errors are inadvertently transcribed by the computer software. Please disregard these errors. Please excuse any errors that have escaped final proofreading.                   Janett Benjamin Dimitry Chi  APA, APC, MPAS, PARICA GuptaThe Rehabilitation Hospital of [facility] Falls

## 2020-07-28 ENCOUNTER — OFFICE VISIT (OUTPATIENT)
Dept: ORTHOPEDIC SURGERY | Facility: CLINIC | Age: 30
End: 2020-07-28

## 2020-07-28 VITALS
RESPIRATION RATE: 18 BRPM | TEMPERATURE: 97.7 F | SYSTOLIC BLOOD PRESSURE: 149 MMHG | DIASTOLIC BLOOD PRESSURE: 97 MMHG | BODY MASS INDEX: 21.39 KG/M2 | HEIGHT: 69 IN | WEIGHT: 144.4 LBS

## 2020-07-28 DIAGNOSIS — S82.001D CLOSED NONDISPLACED FRACTURE OF RIGHT PATELLA WITH ROUTINE HEALING, UNSPECIFIED FRACTURE MORPHOLOGY, SUBSEQUENT ENCOUNTER: Primary | ICD-10-CM
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
## 2020-07-28 NOTE — PROGRESS NOTES
Patient: Love Robles                MRN: [id number]       SSN: [id number]  YOB: 1990        AGE: 31 y.o. SEX: male          PCP: None  07/28/20    Chief Complaint   Patient presents with    Knee Pain     follow up on the right knee     7/20/2020: Patient returns the office status post fall on 7/27/2020. He was getting out of his car when his right foot became lodged under the seal of the car door. He subsequently fell to the ground striking his right knee on the pavement. He was wearing his range of motion brace at the time. The brace was locked at 30 degrees of flexion. He is complaining today of right knee pain and swelling. 7/14/2020: Patient returns for re-x-ray of his right knee. Range of motion brace open to 30 degrees of flexion. He has full extension. Pain is improved overall. He is full weightbearing of his right lower extremity. HISTORY:  Love Robles is a 31 y.o. male who presents to the office today following an injury that occurred on or about 16 June 2020. She was at a gas station shell on [facility]. in [place] at about 8 PM when as it was raining outside he slipped in a puddle of oil gas water mixture while he was attempting to get in his vehicle. He fell to the ground striking his right knee. He immediately had pain in the right knee and subsequently had difficulty walking up. He was seen through a now care on or about the same day and he was diagnosed with a nondisplaced inferior patella fracture of the right knee. He was placed in a medial lateral stay range of motion brace neoprene type. He has persisted with pain to the right knee particularly over the lower portion which limits his ability to stand for only short periods walk only short distances and climb and descend stairs. He does not do any crawling or stooping secondary to pain.     Pain at rest 2-3 on a 10 point scale with his right knee and with activity to include aforementioned walking and standing pain increases to upwards of an 8-9 on a 10 point scale. Pain Assessment  7/28/2020   Location of Pain Knee   Location Modifiers Right   Severity of Pain 7   Quality of Pain Sharp; Throbbing   Duration of Pain Persistent   Frequency of Pain Constant   Aggravating Factors Standing;Walking   Aggravating Factors Comment -   Limiting Behavior Yes   Relieving Factors NSAID   Result of Injury No   Work-Related Injury -   Type of Injury -           No results found for: HBA1C, HGBE8, NQR1BDFM, JKZ2XNXC, KOL8HFUJ  Weight Metrics 7/28/2020 7/14/2020 6/30/2020 9/4/2017 11/4/2015 7/3/2014 8/8/2013   Weight 144 lb 6.4 oz 143 lb 9.6 oz 146 lb 130 lb 135 lb 140 lb 134 lb   BMI 21.32 kg/m2 21.21 kg/m2 21.56 kg/m2 19.2 kg/m2 19.93 kg/m2 20.67 kg/m2 19.78 kg/m2            Problem List Items Addressed This Visit     None      Visit Diagnoses     Closed nondisplaced fracture of right patella with routine healing, unspecified fracture morphology, subsequent encounter    -  Primary    Relevant Orders    AMB POC XRAY, KNEE; 1/2 VIEWS (Completed)    Fall, initial encounter              PAST MEDICAL HISTORY: History reviewed. No pertinent past medical history. PAST SURGICAL HISTORY: History reviewed. No pertinent surgical history. ALLERGIES: No Known Allergies     CURRENT MEDICATIONS:  A list of medications prior to the time of admission include:  Prior to Admission medications    Medication Sig Start Date End Date Taking? Authorizing Provider   elviteg/cob/emtri/tenof alafen (GENVOYA PO) Take  by mouth. Yes Provider, Historical   ibuprofen (MOTRIN) 600 mg tablet Take 1 Tab by mouth every six (6) hours as needed for Pain. 4/10/18  Yes Janee Dutton, KARIN   ondansetron (ZOFRAN ODT) 8 mg disintegrating tablet Take 1 Tab by mouth every eight (8) hours as needed for Nausea for up to 12 doses.  9/4/17   Alina Maravilla MD HYDROcodone-acetaminophen (NORCO) 5-325 mg per tablet Take 1 Tab by mouth every four (4) hours as needed for Pain. 7/3/14   EDISON Garcia   cyclobenzaprine (FLEXERIL) 10 mg tablet Take 1 Tab by mouth three (3) times daily as needed for Muscle Spasm(s). 7/3/14   EDISON Garcia       FAMILY HISTORY: History reviewed. No pertinent family history. SOCIAL HISTORY:   Social History     Socioeconomic History    Marital status: SINGLE     Spouse name: Not on file    Number of children: Not on file    Years of education: Not on file    Highest education level: Not on file   Tobacco Use    Smoking status: Current Every Day Smoker     Packs/day: 0.25     Years: 8.00     Pack years: 2.00    Smokeless tobacco: Never Used   Substance and Sexual Activity    Alcohol use: Yes     Alcohol/week: 2.0 standard drinks     Types: 2 Shots of liquor per week     Comment: socially    Drug use: No    Sexual activity: Yes     Partners: Male       ROS:No CP, No SOB, No fever/chills nor night sweats. No headaches, vision abnormalities to include double and oral loss of vision. No hearing abnormalities. Musculoskeletal pain per HPI. Pain is exacerbated positionally. Pt denies h/o spinal surgery, injections, or PT/chiropractor. Self treated with less than adequate relief on oral antiinflammatories. . Pt denies change in bowel or bladder habits. Pt denies fever, weight loss, or skin changes. EXAM:  Patient alert and oriented x 3,   CN II-XII grossly intact  Sitting comfortably in the exam room, interacting with conversation with pleasant affect. Breathing appears regular effortless with no visible usage of accessory muscles  Distal cap refill intact at 2/2 Jone UE / LE. Neuro intact Jone UE/LE to noxious stimuli    Ortho Specific exam:    Examination of right lower extremity reveals skin intact. He has a trace effusion. No warmth erythema edema or ecchymosis of the right knee.   While supine his range of motion is guarded at 70 degrees of flexion actively -5 degrees to full extension of. There is some slight crepitation through ranging with patella tracking midline. There is tenderness over the inferior pole the patella. The patellar tendon and quad tendon structures are palpable without any defects noted. There is a negative Lockman sign. A negative Jaswinder sign. MCL and LCL are both intact against resistance. No calf tenderness or evidence of DVT of the right lower extremity. X-rays: [address] [place] 6/30/2020 Of the right knee 3 view reveal a nondisplaced inferior third patella fracture. No other osseouswith image repeat on 7/14/2020 revealing no interval change in below, imaging repeated on 7/28/2020 reflect 3 view of the right knee noting moderate callus inlay associated with a nondisplaced inferior third fracture of the patella. Juliaette Kelp lesions masses or step-offs identified. IMPRESSION:      ICD-10-CM ICD-9-CM    1. Closed nondisplaced fracture of right patella with routine healing, unspecified fracture morphology, subsequent encounter  S82.001D V54.16 AMB POC XRAY, KNEE; 1/2 VIEWS   2. Fall, initial encounter  [address] XXXA E888.9    2. Status post fall with nondisplaced right patella fracture, stable    3. Right knee effusion, resolved    4. Decreased range of motion of the right knee secondary to above, guarded wearing range of motion brace by Bijan. PLAN: Today we discussed his diagnosis. Overall he is better. He may remove the brace for hygiene purposes. He was given a note today limiting him to light duty with the ability to stand to sit sit to stand to comfort and limited standing/walking. He was given a note for work off until August 5, 2020. Today his x-rays were reviewed copies were provided all questions answered to his satisfaction. We are to see him back in about 2 weeks for reevaluation and x-ray.                          Patient provided a reminder for a \"due or due soon\" health maintenance. I have asked the patient to schedule an appointment with their primary care provider for follow-up on general health maintenance concerns. Today all the patient's questions were answered to their satisfaction. Copies of x-rays reviewed if obtained this visit, and provided to patient. Dictation disclaimer:  Please note that this dictation was completed with [employer], the computer voice recognition software. Quite often unanticipated grammatical, syntax, homophones, and other interpretive errors are inadvertently transcribed by the computer software. Please disregard these errors. Please excuse any errors that have escaped final proofreading. Jessy ALVARES, APC, MPAS, PA-C  [facility]

## 2020-07-28 NOTE — LETTER
NOTIFICATION RETURN TO WORK  
 
7/28/2020 Mr. Rashawn Rodriguez [place] [address] To Whom It May Concern: 
 
Jeffdaríodemarcus Alvarez is currently under the care of [address] [place] [address]. He will remain out of work until August 5, 2020. If there are questions or concerns please have the patient contact our office.  
 
 
 
Sincerely, 
 
 
Roel Roland PA-C

## 2023-06-28 ENCOUNTER — HOSPITAL ENCOUNTER (EMERGENCY)
Facility: HOSPITAL | Age: 33
Discharge: HOME | End: 2023-06-28
Attending: RADIOLOGY

## 2023-06-28 VITALS
HEART RATE: 75 BPM | TEMPERATURE: 98.8 F | DIASTOLIC BLOOD PRESSURE: 86 MMHG | OXYGEN SATURATION: 100 % | HEIGHT: 69 IN | BODY MASS INDEX: 20.73 KG/M2 | RESPIRATION RATE: 16 BRPM | WEIGHT: 140 LBS | SYSTOLIC BLOOD PRESSURE: 144 MMHG

## 2023-06-28 DIAGNOSIS — K04.7 DENTAL INFECTION: ICD-10-CM

## 2023-06-28 DIAGNOSIS — K08.89 PAIN, DENTAL: Primary | ICD-10-CM

## 2023-06-28 PROCEDURE — 99283 EMERGENCY DEPT VISIT LOW MDM: CPT

## 2023-06-28 RX ORDER — AMOXICILLIN 500 MG/1
500 CAPSULE ORAL 3 TIMES DAILY
Qty: 30 CAPSULE | Refills: 0 | Status: SHIPPED | OUTPATIENT
Start: 2023-06-28 | End: 2023-07-08

## 2023-06-28 RX ORDER — HYDROCODONE BITARTRATE AND ACETAMINOPHEN 5; 325 MG/1; MG/1
1 TABLET ORAL EVERY 6 HOURS PRN
Qty: 10 TABLET | Refills: 0 | Status: SHIPPED | OUTPATIENT
Start: 2023-06-28 | End: 2023-06-29 | Stop reason: ALTCHOICE

## 2023-06-28 RX ORDER — IBUPROFEN 600 MG/1
600 TABLET ORAL EVERY 6 HOURS PRN
Qty: 30 TABLET | Refills: 0 | Status: SHIPPED | OUTPATIENT
Start: 2023-06-28

## 2023-06-28 ASSESSMENT — PAIN - FUNCTIONAL ASSESSMENT: PAIN_FUNCTIONAL_ASSESSMENT: 0-10

## 2023-06-28 ASSESSMENT — ENCOUNTER SYMPTOMS
SHORTNESS OF BREATH: 0
EYE REDNESS: 0
SORE THROAT: 0
NAUSEA: 0
EYE DISCHARGE: 0
RHINORRHEA: 0
VOMITING: 0
DIARRHEA: 0
COUGH: 0
BACK PAIN: 0
ABDOMINAL PAIN: 0
CONSTIPATION: 0
STRIDOR: 0
WHEEZING: 0

## 2023-06-28 ASSESSMENT — PAIN SCALES - GENERAL: PAINLEVEL_OUTOF10: 10

## 2023-06-28 ASSESSMENT — PAIN DESCRIPTION - LOCATION: LOCATION: JAW

## 2023-06-28 ASSESSMENT — PAIN DESCRIPTION - ORIENTATION: ORIENTATION: RIGHT

## 2023-06-29 RX ORDER — OXYCODONE HYDROCHLORIDE AND ACETAMINOPHEN 5; 325 MG/1; MG/1
1 TABLET ORAL EVERY 6 HOURS PRN
Qty: 5 TABLET | Refills: 0 | Status: SHIPPED | OUTPATIENT
Start: 2023-06-29 | End: 2023-07-02